# Patient Record
(demographics unavailable — no encounter records)

---

## 2025-06-05 NOTE — PROCEDURE
[Large Joint Injection] : Large joint injection [Left] : of the left [Greater Trochanteric Bursa] : greater trochanteric bursa [Pain] : pain [X-ray evidence of Osteoarthritis on this or prior visit] : x-ray evidence of Osteoarthritis on this or prior visit [Alcohol] : alcohol [Ethyl Chloride sprayed topically] : ethyl chloride sprayed topically [Sterile technique used] : sterile technique used [____] : [unfilled] [] : Patient tolerated procedure well [Call if redness, pain or fever occur] : call if redness, pain or fever occur [Apply ice for 15min out of every hour for the next 12-24 hours as tolerated] : apply ice for 15 minutes out of every hour for the next 12-24 hours as tolerated [Risks, benefits, alternatives discussed / Verbal consent obtained] : the risks benefits, and alternatives have been discussed, and verbal consent was obtained

## 2025-06-05 NOTE — DISCUSSION/SUMMARY
[de-identified] : Left hip pain  HPI Patient is a 74-year-old [place] speaking female accompanied by her daughter reports to the office for evaluation of her left hip pain.  Pain has been aggravating her on or off for the past several weeks.  Denies any recent trauma or injury to the area.  Sleeping on her left side, walking, range of motion, and palpating certain areas aggravate the patient's pain.  Denies any numbness or tingling.  Has been taking ibuprofen which has given her some relief.  Left hip with pelvic x-rays taken in office today revealed no obvious fractures, subluxations, or dislocations.  Joint space narrowing with degenerative/arthritic changes noted.  Otherwise, no other significant abnormalities were seen.  Left hip exam is as follows: No swelling noted.  No erythema or ecchymosis.  TTP greater trochanteric bursa.  Able to perform active straight leg raise.  Mild limited range of motion secondary to pain.  Positive Sierra's test.  Positive impingement.  Light touch intact throughout.  Mild antalgic gait.  Assessment/plan Explained x-ray findings.  She clinically also has greater trochanteric bursitis.  She may continue ibuprofen 800 mg as needed for pain.  She was advised to monitor blood pressure while taking this medication.  The patient was advised to rest/ice the area and may alternate with warm compresses as needed.  The hip conditioning program from the [facility] was given to the patient so they may try that at home.  With the patient's approval, and under sterile technique, I performed a cortisone injection today.  See the attached procedure note for further details.  The patient was informed that their next cortisone injection could not be until a minimum of three months from today's date and the patient understands.  Explained to the patient that the full effect of the injection will take 3-5 days to kick in.   Follow-up in 4 months.  All question/concerns were answered in detail.